# Patient Record
Sex: FEMALE | Race: BLACK OR AFRICAN AMERICAN | NOT HISPANIC OR LATINO | Employment: OTHER | URBAN - METROPOLITAN AREA
[De-identification: names, ages, dates, MRNs, and addresses within clinical notes are randomized per-mention and may not be internally consistent; named-entity substitution may affect disease eponyms.]

---
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
## 2021-02-26 ENCOUNTER — OFFICE VISIT (OUTPATIENT)
Dept: URGENT CARE | Facility: CLINIC | Age: 21
End: 2021-02-26
Payer: MEDICAID

## 2021-02-26 VITALS
RESPIRATION RATE: 16 BRPM | BODY MASS INDEX: 24.94 KG/M2 | TEMPERATURE: 98 F | WEIGHT: 127 LBS | HEIGHT: 60 IN | DIASTOLIC BLOOD PRESSURE: 91 MMHG | OXYGEN SATURATION: 99 % | HEART RATE: 106 BPM | SYSTOLIC BLOOD PRESSURE: 147 MMHG

## 2021-02-26 DIAGNOSIS — N91.2 AMENORRHEA: ICD-10-CM

## 2021-02-26 DIAGNOSIS — N92.6 MISSED PERIOD: Primary | ICD-10-CM

## 2021-02-26 LAB
B-HCG UR QL: NEGATIVE
BILIRUB UR QL STRIP: NEGATIVE
CTP QC/QA: YES
GLUCOSE UR QL STRIP: NEGATIVE
KETONES UR QL STRIP: NEGATIVE
LEUKOCYTE ESTERASE UR QL STRIP: NEGATIVE
PH, POC UA: 6.5 (ref 5–8)
POC BLOOD, URINE: NEGATIVE
POC NITRATES, URINE: NEGATIVE
PROT UR QL STRIP: NEGATIVE
SP GR UR STRIP: 1.01 (ref 1–1.03)
UROBILINOGEN UR STRIP-ACNC: NORMAL (ref 0.1–1.1)

## 2021-02-26 PROCEDURE — 81025 URINE PREGNANCY TEST: CPT | Mod: S$GLB,,, | Performed by: INTERNAL MEDICINE

## 2021-02-26 PROCEDURE — 99203 OFFICE O/P NEW LOW 30 MIN: CPT | Mod: S$GLB,,, | Performed by: EMERGENCY MEDICINE

## 2021-02-26 PROCEDURE — 81003 POCT URINALYSIS, DIPSTICK, AUTOMATED, W/O SCOPE: ICD-10-PCS | Mod: QW,S$GLB,, | Performed by: INTERNAL MEDICINE

## 2021-02-26 PROCEDURE — 81003 URINALYSIS AUTO W/O SCOPE: CPT | Mod: QW,S$GLB,, | Performed by: INTERNAL MEDICINE

## 2021-02-26 PROCEDURE — 99203 PR OFFICE/OUTPT VISIT, NEW, LEVL III, 30-44 MIN: ICD-10-PCS | Mod: S$GLB,,, | Performed by: EMERGENCY MEDICINE

## 2021-02-26 PROCEDURE — 81025 POCT URINE PREGNANCY: ICD-10-PCS | Mod: S$GLB,,, | Performed by: INTERNAL MEDICINE

## 2023-09-21 ENCOUNTER — HOSPITAL ENCOUNTER (EMERGENCY)
Facility: HOSPITAL | Age: 23
Discharge: HOME | End: 2023-09-21
Attending: EMERGENCY MEDICINE
Payer: MEDICAID

## 2023-09-21 VITALS
RESPIRATION RATE: 12 BRPM | WEIGHT: 110 LBS | DIASTOLIC BLOOD PRESSURE: 79 MMHG | TEMPERATURE: 99 F | SYSTOLIC BLOOD PRESSURE: 127 MMHG | HEIGHT: 60 IN | HEART RATE: 101 BPM | BODY MASS INDEX: 21.6 KG/M2 | OXYGEN SATURATION: 99 %

## 2023-09-21 DIAGNOSIS — M79.10 MYALGIA: Primary | ICD-10-CM

## 2023-09-21 LAB
ALBUMIN SERPL-MCNC: 3.5 G/DL (ref 3.3–5.5)
ALP SERPL-CCNC: 96 U/L (ref 42–141)
B-HCG UR QL: NEGATIVE
BILIRUB SERPL-MCNC: 0.4 MG/DL (ref 0.2–1.6)
BUN SERPL-MCNC: 9 MG/DL (ref 7–22)
CALCIUM SERPL-MCNC: 9.5 MG/DL (ref 8–10.3)
CHLORIDE SERPL-SCNC: 107 MMOL/L (ref 98–108)
CREAT SERPL-MCNC: 1.1 MG/DL (ref 0.6–1.2)
CTP QC/QA: YES
GLUCOSE SERPL-MCNC: 104 MG/DL (ref 73–118)
POC ALT (SGPT): 15 U/L (ref 10–47)
POC AST (SGOT): 26 U/L (ref 11–38)
POC TCO2: 29 MMOL/L (ref 18–33)
POTASSIUM BLD-SCNC: 3.9 MMOL/L (ref 3.6–5.1)
PROTEIN, POC: 6.2 G/DL (ref 6.4–8.1)
SODIUM BLD-SCNC: 140 MMOL/L (ref 128–145)

## 2023-09-21 PROCEDURE — 63600175 PHARM REV CODE 636 W HCPCS: Mod: ER | Performed by: NURSE PRACTITIONER

## 2023-09-21 PROCEDURE — 81025 URINE PREGNANCY TEST: CPT | Mod: ER

## 2023-09-21 PROCEDURE — 80053 COMPREHEN METABOLIC PANEL: CPT | Mod: ER

## 2023-09-21 PROCEDURE — 81025 URINE PREGNANCY TEST: CPT | Mod: ER | Performed by: EMERGENCY MEDICINE

## 2023-09-21 PROCEDURE — 99283 EMERGENCY DEPT VISIT LOW MDM: CPT | Mod: 25,ER

## 2023-09-21 RX ORDER — PREDNISONE 20 MG/1
60 TABLET ORAL
Status: COMPLETED | OUTPATIENT
Start: 2023-09-21 | End: 2023-09-21

## 2023-09-21 RX ORDER — DICLOFENAC SODIUM 10 MG/G
2 GEL TOPICAL 4 TIMES DAILY
Qty: 50 G | Refills: 0 | Status: SHIPPED | OUTPATIENT
Start: 2023-09-21 | End: 2023-09-28

## 2023-09-21 RX ADMIN — PREDNISONE 60 MG: 20 TABLET ORAL at 03:09

## 2023-09-21 NOTE — ED TRIAGE NOTES
22 y.o female presents to the ED with chief complaint of rash. Reports rash and swelling and bilaterally forearms x 1 week. Denies fever and body aches. Denies any other complaints. AAOx4, NAD.

## 2023-09-21 NOTE — ED PROVIDER NOTES
"Encounter Date: 9/21/2023    SCRIBE #1 NOTE: I, Jonas Dowling, am scribing for, and in the presence of,  Evelyn Mathur FNP. I have scribed the following portions of the note - Other sections scribed: HPI,ROS.       History     Chief Complaint   Patient presents with    Rash     Complains of painful "knots" to forearms and upper arms bilaterally x1 week.      Thanh Le is a 22 y.o. female who presents to the ED for evaluation of bilateral forearm swelling onset "last week." Patient also c/o upper right arm pain. Patient states the forearms are painful to touch. She has tried taking ibuprofen and bathing in epsom salt with no relief. No IV drug use.     The history is provided by the patient. No  was used.     Review of patient's allergies indicates:  No Known Allergies  Past Medical History:   Diagnosis Date    Allergic rhinitis 2018    Inguinal hernia 2001     Past Surgical History:   Procedure Laterality Date    HERNIA REPAIR  2018     Family History   Problem Relation Age of Onset    No Known Problems Mother     No Known Problems Sister     Cancer Maternal Grandmother     Cancer Maternal Grandfather     Congenital heart disease Neg Hx     Early death Neg Hx     Pacemaker/defibrilator Neg Hx     Heart attacks under age 50 Neg Hx      Social History     Tobacco Use    Smoking status: Every Day     Types: Cigarettes    Smokeless tobacco: Never   Substance Use Topics    Alcohol use: Not Currently    Drug use: Not Currently     Review of Systems   Constitutional:  Negative for fever.   HENT:  Negative for sore throat.    Respiratory:  Negative for shortness of breath.    Cardiovascular:  Negative for chest pain.   Gastrointestinal:  Negative for nausea.   Genitourinary:  Negative for dysuria.   Musculoskeletal:  Positive for myalgias. Negative for back pain.        (+) forearm swelling (+) arm pain   Skin:  Negative for color change, rash and wound.   Neurological:  Negative for weakness. "   Hematological:  Does not bruise/bleed easily.   All other systems reviewed and are negative.      Physical Exam     Initial Vitals [09/21/23 1357]   BP Pulse Resp Temp SpO2   127/79 101 12 98.9 °F (37.2 °C) 99 %      MAP       --         Physical Exam    Nursing note and vitals reviewed.  Constitutional: She appears well-developed and well-nourished.   HENT:   Head: Normocephalic and atraumatic.   Eyes: Conjunctivae and EOM are normal. Pupils are equal, round, and reactive to light.   Neck:   Normal range of motion.  Cardiovascular:  Normal rate, regular rhythm, normal heart sounds and intact distal pulses.     Exam reveals no gallop and no friction rub.       No murmur heard.  Pulmonary/Chest: Breath sounds normal. No respiratory distress. She has no wheezes. She has no rhonchi. She has no rales. She exhibits no tenderness.   Abdominal: Abdomen is soft. Bowel sounds are normal. She exhibits no distension and no mass. There is no abdominal tenderness. There is no rebound and no guarding.   Musculoskeletal:         General: Tenderness present. No edema. Normal range of motion.      Cervical back: Normal range of motion.      Comments: Pt has healing mosquito bites noted to hte left forearm- no evidence of abscess or cellulitis     Neurological: She is alert and oriented to person, place, and time. She has normal strength. GCS score is 15. GCS eye subscore is 4. GCS verbal subscore is 5. GCS motor subscore is 6.   Skin: Skin is warm. Capillary refill takes less than 2 seconds. No rash noted. No erythema.   Psychiatric: She has a normal mood and affect.       ED Course   Procedures  Labs Reviewed   POCT CMP - Abnormal; Notable for the following components:       Result Value    Protein, POC 6.2 (*)     All other components within normal limits   POCT URINE PREGNANCY   POCT CBC   POCT CMP            Imaging Results    None          Medications   predniSONE tablet 60 mg (60 mg Oral Given 9/21/23 1523)     Medical  Decision Making  23 y/o female with bilateral forearm pain. She does have minimal swelling ot her left arm but she is squeezing it and I believe this is reactive edema. She does not have any signs of infection. She was given a single dose of prednisone in the ED. She has been advised to follow up with her PCP for further testing if the problem continues. Patient given strict return precautions and voiced understanding of all discharge instructions. Pt was stable at discharge.           Problems Addressed:  Myalgia: acute illness or injury    Amount and/or Complexity of Data Reviewed  Labs: ordered. Decision-making details documented in ED Course.    Risk  OTC drugs.  Prescription drug management.            Scribe Attestation:   Scribe #1: I performed the above scribed service and the documentation accurately describes the services I performed. I attest to the accuracy of the note.      I, CLAUDIO Mathias, personally performed the services described in this documentation.  All medical record entries made by the scribe were at my direction and in my presence.  I have reviewed the chart and agree that the record reflects my personal performance and is accurate and complete.    ED Course as of 09/21/23 1617   Thu Sep 21, 2023   1401 BP: 127/79 [AT]   1401 Temp: 98.9 °F (37.2 °C) [AT]   1401 Temp Source: Oral [AT]   1401 Pulse: 101 [AT]   1401 Resp: 12 [AT]   1401 SpO2: 99 % [AT]   1423 Preg Test, Ur: Negative [AT]      ED Course User Index  [AT] Evelyn Mathur FNP                    Clinical Impression:   Final diagnoses:  [M79.10] Myalgia (Primary)        ED Disposition Condition    Discharge Stable          ED Prescriptions       Medication Sig Dispense Start Date End Date Auth. Provider    diclofenac sodium (VOLTAREN) 1 % Gel Apply 2 g topically 4 (four) times daily. for 7 days 50 g 9/21/2023 9/28/2023 Evelyn Mathur FNP          Follow-up Information       Follow up With Specialties Details Why  Contact Info    primary care provider as needed                 Evelyn Mathur, [facility]  09/21/23 6003

## 2024-04-14 ENCOUNTER — HOSPITAL ENCOUNTER (EMERGENCY)
Facility: HOSPITAL | Age: 24
Discharge: HOME | End: 2024-04-14
Attending: EMERGENCY MEDICINE
Payer: MEDICAID

## 2024-04-14 VITALS
BODY MASS INDEX: 20.7 KG/M2 | OXYGEN SATURATION: 98 % | RESPIRATION RATE: 20 BRPM | TEMPERATURE: 98 F | WEIGHT: 106 LBS | SYSTOLIC BLOOD PRESSURE: 123 MMHG | HEART RATE: 88 BPM | DIASTOLIC BLOOD PRESSURE: 88 MMHG

## 2024-04-14 DIAGNOSIS — N30.01 ACUTE CYSTITIS WITH HEMATURIA: Primary | ICD-10-CM

## 2024-04-14 LAB
B-HCG UR QL: NEGATIVE
BILIRUBIN, POC UA: NEGATIVE
BLOOD, POC UA: ABNORMAL
CLARITY, POC UA: CLEAR
COLOR, POC UA: ABNORMAL
CTP QC/QA: YES
GLUCOSE, POC UA: NEGATIVE
KETONES, POC UA: ABNORMAL
LEUKOCYTE EST, POC UA: ABNORMAL
NITRITE, POC UA: POSITIVE
PH UR STRIP: 6 [PH]
PROTEIN, POC UA: ABNORMAL
SPECIFIC GRAVITY, POC UA: 1.02
UROBILINOGEN, POC UA: 1 E.U./DL

## 2024-04-14 PROCEDURE — 99284 EMERGENCY DEPT VISIT MOD MDM: CPT | Mod: ER

## 2024-04-14 PROCEDURE — 81025 URINE PREGNANCY TEST: CPT | Mod: ER | Performed by: EMERGENCY MEDICINE

## 2024-04-14 RX ORDER — PHENAZOPYRIDINE HYDROCHLORIDE 200 MG/1
200 TABLET, FILM COATED ORAL
Qty: 9 TABLET | Refills: 0 | Status: SHIPPED | OUTPATIENT
Start: 2024-04-14 | End: 2024-04-17

## 2024-04-14 RX ORDER — CEPHALEXIN 500 MG/1
500 CAPSULE ORAL 2 TIMES DAILY
Qty: 14 CAPSULE | Refills: 0 | Status: SHIPPED | OUTPATIENT
Start: 2024-04-14 | End: 2024-04-21

## 2024-04-14 NOTE — DISCHARGE INSTRUCTIONS
Be sure to complete all antibiotics as prescribed.  You may take Pyridium as needed for your urinary symptoms such as abdominal pain, cramping, pressure.  Drink plenty of water stay hydrated.  Try to limit your bath time and use non scented, sensitive skin soaps to prevent vaginal irritation.    Follow up with your PCP if symptoms persist or worsen.

## 2024-04-14 NOTE — ED PROVIDER NOTES
Encounter Date: 4/14/2024       History     Chief Complaint   Patient presents with    Abdominal Pain    Vaginal Bleeding     Pt began with right sided abdominal pain and vaginal bleeding        22 y/o female with PMH of arthritis presents for emergent evaluation of lower abdominal pain X 7 days followed by urinary symptoms X 3 days.  Patient reports a persistent sharp cramping abdominal pain to her right lower pubic region.  She denied any provoking or alleviating factors.  She states she was currently on her menstrual cycle which ended 5 days prior.  She states that pain was similar to menstrual cramping but more severe.  She says that the pain persisted after her cycle ended and then noticed blood-tinged urine on Friday along with some dysuria, pelvic pressure, urinary frequency and urinary urgency prompting her to report to ED. she denies any concern for STDs.  Denies any flank pain or CVA tenderness.  Tolerating PO without difficulty.  Denies any nausea, vomiting, diarrhea, vaginal discharge, vaginal irritation, vaginal bleeding.  Reports adequate water intake.  Denies any chest pain, shortness breath, weakness, myalgias or any other complaints at this time.      Review of patient's allergies indicates:  No Known Allergies  Past Medical History:   Diagnosis Date    Allergic rhinitis 2018    Inguinal hernia 2001     Past Surgical History:   Procedure Laterality Date    HERNIA REPAIR  2018     Family History   Problem Relation Name Age of Onset    No Known Problems Mother      No Known Problems Sister      Cancer Maternal Grandmother      Cancer Maternal Grandfather      Congenital heart disease Neg Hx      Early death Neg Hx      Pacemaker/defibrilator Neg Hx      Heart attacks under age 50 Neg Hx       Social History     Tobacco Use    Smoking status: Every Day     Types: Cigarettes    Smokeless tobacco: Never   Substance Use Topics    Alcohol use: Not Currently    Drug use: Not Currently     Review of Systems    Constitutional:  Negative for chills and fever.   HENT:  Negative for congestion, rhinorrhea, sore throat and trouble swallowing.    Respiratory:  Negative for cough and shortness of breath.    Cardiovascular:  Negative for chest pain.   Gastrointestinal:  Positive for abdominal pain. Negative for abdominal distention, constipation, diarrhea, nausea and vomiting.   Genitourinary:  Positive for dysuria, frequency, hematuria and urgency. Negative for decreased urine volume, flank pain, vaginal bleeding, vaginal discharge and vaginal pain.   Musculoskeletal:  Negative for back pain and myalgias.   Skin:  Negative for rash.   Neurological:  Negative for dizziness, weakness, light-headedness and headaches.       Physical Exam     Initial Vitals [04/14/24 1306]   BP Pulse Resp Temp SpO2   123/88 88 20 98.4 °F (36.9 °C) 98 %      MAP       --         Physical Exam    Nursing note and vitals reviewed.  Constitutional: She appears well-developed and well-nourished. She is not diaphoretic. No distress.   HENT:   Head: Normocephalic and atraumatic.   Right Ear: External ear normal.   Left Ear: External ear normal.   Mouth/Throat: Oropharynx is clear and moist.   Eyes: Conjunctivae and EOM are normal. Right eye exhibits no discharge. Left eye exhibits no discharge. No scleral icterus.   Neck: Neck supple. No tracheal deviation present.   Normal range of motion.  Cardiovascular:  Normal rate, regular rhythm and normal heart sounds.           Pulmonary/Chest: Breath sounds normal. No respiratory distress. She has no wheezes.   Abdominal: Abdomen is soft. She exhibits no distension. There is abdominal tenderness.   Suprapubic tenderness.  No flank pain.  No CVA tenderness. There is no rebound and no guarding.   Musculoskeletal:         General: Normal range of motion.      Cervical back: Normal range of motion and neck supple.     Neurological: She is alert and oriented to person, place, and time. She has normal strength. GCS  score is 15. GCS eye subscore is 4. GCS verbal subscore is 5. GCS motor subscore is 6.   Skin: Skin is warm and dry. Capillary refill takes less than 2 seconds. No rash noted.   Psychiatric: She has a normal mood and affect. Thought content normal.         ED Course   Procedures  Labs Reviewed   POCT URINALYSIS W/O SCOPE - Abnormal; Notable for the following components:       Result Value    Ketones, UA 1+ (*)     Blood, UA 3+ (*)     Protein, UA 2+ (*)     Nitrite, UA Positive (*)     Leukocytes, UA 1+ (*)     All other components within normal limits   POCT URINE PREGNANCY   POCT URINALYSIS(INSTRUMENT)          Imaging Results    None          Medications - No data to display  Medical Decision Making  This is an emergent evaluation of a 23 y.o. female presenting to the ED for urinary symptoms. Afebrile. Patient is non-toxic appearing and in no acute distress.   Differential Diagnosis includes, but is not limited to:  UTI/pyelonephritis, pregnancy, kidney stone, urinary retention, urethritis, appendicitis, prostatitis, testicular torsion/mass, incarcerated/strangulated hernia.     Presentation consistent with acute cystitis. No clinical evidence to suggest acute pyelonephritis at this time. No urosepsis. Lower suspicion for infected renal stone at this time. Does not endorse Hx or risk factors concerning for pelvic etiology at this time, including for PID and risk of TOA. I carefully consider, but doubt acute appendicitis.     Sent home with supportive care and antibiotics. Advised on supportive care. Advising PCP follow up. Strict return precautions discussed. Agreeable to plan.     I discussed with the patient the diagnosis, treatment plan, indications for return to the emergency department, and for expected follow-up. The patient verbalized an understanding. The patient is asked if there are any questions or concerns. We discuss the case, until all issues are addressed to the patient's satisfaction. Patient  understands and is agreeable to the plan.     Amount and/or Complexity of Data Reviewed  External Data Reviewed: labs and notes.  Labs: ordered. Decision-making details documented in ED Course.    Risk  Prescription drug management.  Diagnosis or treatment significantly limited by social determinants of health.               ED Course as of 04/14/24 1504   Sun Apr 14, 2024   1418 hCG Qualitative, Urine: Negative [CC]      ED Course User Index  [CC] Shelbi Mack PA-C                           Clinical Impression:  Final diagnoses:  [N30.01] Acute cystitis with hematuria (Primary)          ED Disposition Condition    Discharge Stable          ED Prescriptions       Medication Sig Dispense Start Date End Date Auth. Provider    cephALEXin (KEFLEX) 500 MG capsule Take 1 capsule (500 mg total) by mouth 2 (two) times a day. for 7 days 14 capsule 4/14/2024 4/21/2024 Shelbi Mack PA-C    phenazopyridine (PYRIDIUM) 200 MG tablet Take 1 tablet (200 mg total) by mouth every meal as needed for Pain (UTI pains). 9 tablet 4/14/2024 4/17/2024 Shelbi Mack PA-C          Follow-up Information       Follow up With Specialties Details Why Contact Info    [facility] ED Emergency Medicine Go to  For new or worsening symptoms [id number] [facility] [id number] [phone]             Shelbi Mack PA-C  04/14/24 150

## 2024-09-12 ENCOUNTER — HOSPITAL ENCOUNTER (EMERGENCY)
Facility: HOSPITAL | Age: 24
Discharge: HOME | End: 2024-09-12
Attending: EMERGENCY MEDICINE
Payer: MEDICAID

## 2024-09-12 DIAGNOSIS — Z71.1 PHYSICALLY WELL BUT WORRIED: Primary | ICD-10-CM

## 2024-09-12 PROCEDURE — 99281 EMR DPT VST MAYX REQ PHY/QHP: CPT | Mod: ER

## 2024-09-13 NOTE — ED NOTES
Pt requesting an STI checkup due to having a new sexual partner. Pt denies any STI symptoms at time of visit. [facility] notified and spoke in triage with patient regarding free walk-in STI clinic resources. This RN provided pt with handout of this information, as well. Pt felt this was adequate information and left before triage was completed.

## 2024-09-13 NOTE — ED PROVIDER NOTES
Encounter Date: 9/12/2024       History   No chief complaint on file.    Patient is a 23-year-old female with no significant medical history who presents to the emergency department requesting full STD testing.  Denies any symptoms.  Reports she has a new partner so she wants to just be checked for everything.    The history is provided by the patient.     Review of patient's allergies indicates:  No Known Allergies  Past Medical History:   Diagnosis Date    Allergic rhinitis 2018    Inguinal hernia 2001     Past Surgical History:   Procedure Laterality Date    HERNIA REPAIR  2018     Family History   Problem Relation Name Age of Onset    No Known Problems Mother      No Known Problems Sister      Cancer Maternal Grandmother      Cancer Maternal Grandfather      Congenital heart disease Neg Hx      Early death Neg Hx      Pacemaker/defibrilator Neg Hx      Heart attacks under age 50 Neg Hx       Social History     Tobacco Use    Smoking status: Every Day     Types: Cigarettes    Smokeless tobacco: Never   Substance Use Topics    Alcohol use: Not Currently    Drug use: Not Currently     Review of Systems   Constitutional:  Negative for activity change, appetite change and fatigue.   Gastrointestinal:  Negative for abdominal pain and vomiting.   Genitourinary:  Negative for dysuria, genital sores, menstrual problem and vaginal discharge.   Skin:  Negative for rash and wound.       Physical Exam     Initial Vitals   BP Pulse Resp Temp SpO2   -- -- -- -- --      MAP       --         Physical Exam    Nursing note and vitals reviewed.  Constitutional: She appears well-developed and well-nourished. She is not diaphoretic. No distress.   HENT:   Head: Normocephalic.   Right Ear: External ear normal.   Left Ear: External ear normal.   Eyes: Conjunctivae are normal.     Neurological: She is alert and oriented to person, place, and time.   Skin: Skin is warm and dry.   Psychiatric: She has a normal mood and affect.         ED  Course   Procedures  Labs Reviewed - No data to display       Imaging Results    None          Medications - No data to display  Medical Decision Making  Urgent evaluation of a 23-year-old female who presents to the emergency department for STD checking.  She has no symptoms.  Gave her contact information to go to the STD clinic for full testing.                                      Clinical Impression:  Final diagnoses:  [Z71.1] Physically well but worried (Primary)          ED Disposition Condition    Discharge Stable          ED Prescriptions    None       Follow-up Information    None          Abbi Arreola, AURELIO  09/12/24 1937